# Patient Record
Sex: FEMALE
[De-identification: names, ages, dates, MRNs, and addresses within clinical notes are randomized per-mention and may not be internally consistent; named-entity substitution may affect disease eponyms.]

---

## 2021-08-11 ENCOUNTER — TRANSCRIPTION ENCOUNTER (OUTPATIENT)
Age: 26
End: 2021-08-11

## 2022-01-05 PROBLEM — Z00.00 ENCOUNTER FOR PREVENTIVE HEALTH EXAMINATION: Status: ACTIVE | Noted: 2022-01-05

## 2022-01-09 PROBLEM — Z78.9 CURRENT NON-SMOKER: Status: ACTIVE | Noted: 2022-01-09

## 2022-01-09 PROBLEM — Z72.89 ALCOHOL USE: Status: ACTIVE | Noted: 2022-01-09

## 2022-01-10 ENCOUNTER — APPOINTMENT (OUTPATIENT)
Dept: OBGYN | Facility: CLINIC | Age: 27
End: 2022-01-10

## 2022-01-10 DIAGNOSIS — Z72.89 OTHER PROBLEMS RELATED TO LIFESTYLE: ICD-10-CM

## 2022-01-10 DIAGNOSIS — Z78.9 OTHER SPECIFIED HEALTH STATUS: ICD-10-CM

## 2022-01-20 ENCOUNTER — NON-APPOINTMENT (OUTPATIENT)
Age: 27
End: 2022-01-20

## 2022-01-20 ENCOUNTER — APPOINTMENT (OUTPATIENT)
Dept: OBGYN | Facility: CLINIC | Age: 27
End: 2022-01-20

## 2023-03-20 ENCOUNTER — APPOINTMENT (OUTPATIENT)
Dept: OBGYN | Facility: CLINIC | Age: 28
End: 2023-03-20
Payer: COMMERCIAL

## 2023-03-20 VITALS
BODY MASS INDEX: 25.4 KG/M2 | WEIGHT: 138 LBS | DIASTOLIC BLOOD PRESSURE: 71 MMHG | HEIGHT: 62 IN | HEART RATE: 56 BPM | SYSTOLIC BLOOD PRESSURE: 106 MMHG

## 2023-03-20 DIAGNOSIS — Z01.419 ENCOUNTER FOR GYNECOLOGICAL EXAMINATION (GENERAL) (ROUTINE) W/OUT ABNORMAL FINDINGS: ICD-10-CM

## 2023-03-20 PROCEDURE — 99395 PREV VISIT EST AGE 18-39: CPT

## 2023-03-22 LAB
C TRACH RRNA SPEC QL NAA+PROBE: NOT DETECTED
N GONORRHOEA RRNA SPEC QL NAA+PROBE: NOT DETECTED
SOURCE TP AMPLIFICATION: NORMAL

## 2023-03-23 LAB — CYTOLOGY CVX/VAG DOC THIN PREP: NORMAL

## 2023-08-21 ENCOUNTER — APPOINTMENT (OUTPATIENT)
Dept: OBGYN | Facility: CLINIC | Age: 28
End: 2023-08-21
Payer: COMMERCIAL

## 2023-08-21 VITALS
BODY MASS INDEX: 25.21 KG/M2 | HEART RATE: 83 BPM | SYSTOLIC BLOOD PRESSURE: 96 MMHG | HEIGHT: 62 IN | DIASTOLIC BLOOD PRESSURE: 62 MMHG | WEIGHT: 137 LBS

## 2023-08-21 DIAGNOSIS — Z30.9 ENCOUNTER FOR CONTRACEPTIVE MANAGEMENT, UNSPECIFIED: ICD-10-CM

## 2023-08-21 PROCEDURE — 99213 OFFICE O/P EST LOW 20 MIN: CPT

## 2023-08-21 RX ORDER — MISOPROSTOL 200 UG/1
200 TABLET ORAL
Qty: 1 | Refills: 0 | Status: ACTIVE | COMMUNITY
Start: 2023-08-21 | End: 1900-01-01

## 2023-08-23 NOTE — COUNSELING
[Contraception/ Emergency Contraception/ Safe Sexual Practices] : contraception, emergency contraception, safe sexual practices [Medication Management] : medication management [TextEntry] : All forms of reversible contraception including combined hormonal contraception, progestin-only contraceptives, IUDs, and implants were reviewed with the patient. The risks, benefits, and alternatives were discussed.  CHC including pill, patch, and ring, was discussed with the patient. Common side-effects of CHC were reviewed. Typical effectiveness rates of 91% were reviewed.  Progestin-only contraceptives including progestin-only pills, DMPA, the subdermal implant and the hormonal IUDs were discussed with the patient. The patient was counseled about the risks and benefits of POP's, DMPA, implant, and hormonal IUD contraception. Common side-effects of progestin-only contraceptives including changes in bleeding patterns were reviewed.  The possibility of irregular bleeding in the first 3-6 months of Mirena use followed by increased likelihood of amenorrhea was reviewed and all questions answered.  The non-hormonal copper IUD was also reviewed and the potential for heavier, crampier, or longer periods with ParaGard. The patient was told that this may improve over time.  She is aware that hormonal contraceptives, IUDs and implants do not protect against sexually transmitted diseases and encouraged her to use condoms with her contraception if she is at risk for a STD.  She was also told to call with any problematic side-effects to discuss management or changing to another contraceptive method before discontinuing.

## 2023-08-23 NOTE — HISTORY OF PRESENT ILLNESS
[FreeTextEntry1] : 26y/o presents to discuss contraception  LMP Aug, monthly periods, last 3-4 days, no significant cramping.  On OCPs during college  Interesting in IUD

## 2023-08-23 NOTE — PLAN
[FreeTextEntry1] : 26y/o presents to [facility] contraception. Pt desires Mirena IUD placement   #Contraception -Counseling as above -Billing team tasked for authorization -GC/CT neg in March, will send again prior to placement -Will give misoprostol 200mcg, instructed pt to place in vagina on morning of insertion. Rx sent -Counseled on no unprotected intercourse for 7 days prior to insertion -Plan for paracervical block and IM Toradol at time of insertion -Pt counseled to expect heavy period after insertion followed by spotting for 3-6months  jasper STEPHENS

## 2023-08-28 ENCOUNTER — APPOINTMENT (OUTPATIENT)
Dept: OBGYN | Facility: CLINIC | Age: 28
End: 2023-08-28
Payer: COMMERCIAL

## 2023-08-28 VITALS
SYSTOLIC BLOOD PRESSURE: 118 MMHG | DIASTOLIC BLOOD PRESSURE: 80 MMHG | BODY MASS INDEX: 25.56 KG/M2 | WEIGHT: 138.9 LBS | HEART RATE: 67 BPM | HEIGHT: 62 IN

## 2023-08-28 DIAGNOSIS — Z30.430 ENCOUNTER FOR INSERTION OF INTRAUTERINE CONTRACEPTIVE DEVICE: ICD-10-CM

## 2023-08-28 PROCEDURE — 58300 INSERT INTRAUTERINE DEVICE: CPT

## 2023-08-28 RX ORDER — LEVONORGESTREL 52 MG/1
20 INTRAUTERINE DEVICE INTRAUTERINE
Qty: 0 | Refills: 0 | Status: COMPLETED | OUTPATIENT
Start: 2023-08-28

## 2023-08-28 NOTE — PROCEDURE
[IUD Placement] : intrauterine device (IUD) placement [Time out performed] : Pre-procedure time out performed.  Patient's name, date of birth and procedure confirmed. [Consent Obtained] : Consent obtained [Prevention of Pregnancy] : prevention of pregnancy [Risks] : risks [Benefits] : benefits [Patient] : patient [Infection] : infection [Bleeding] : bleeding [Pain] : pain [Expulsion] : expulsion [Failure] : failure [Uterine Perforation] : uterine perforation [Neg Pregnancy Test] : negative pregnancy test [Neg GC/Chlamydia] : negative GC/Chlamydia [Betadine] : Betadine [Tenaculum] : Tenaculum [Easy Passage] : Easy passage [Mirena IUD] : Mirena IUD [Tolerated Well] : Patient tolerated the procedure well [No Complications] : No complications [None] : None [de-identified] : IM Toradol [de-identified] : [id number] [de-identified] : 10/2025 [de-identified] : [id number]

## 2023-08-29 ENCOUNTER — APPOINTMENT (OUTPATIENT)
Dept: OBGYN | Facility: CLINIC | Age: 28
End: 2023-08-29
Payer: COMMERCIAL

## 2023-08-29 ENCOUNTER — ASOB RESULT (OUTPATIENT)
Age: 28
End: 2023-08-29

## 2023-08-29 PROCEDURE — 76830 TRANSVAGINAL US NON-OB: CPT

## 2024-11-18 ENCOUNTER — APPOINTMENT (OUTPATIENT)
Dept: OBGYN | Facility: CLINIC | Age: 29
End: 2024-11-18